# Patient Record
Sex: MALE | Race: WHITE | NOT HISPANIC OR LATINO | Employment: FULL TIME | URBAN - METROPOLITAN AREA
[De-identification: names, ages, dates, MRNs, and addresses within clinical notes are randomized per-mention and may not be internally consistent; named-entity substitution may affect disease eponyms.]

---

## 2017-08-21 ENCOUNTER — OFFICE VISIT (OUTPATIENT)
Dept: URGENT CARE | Age: 22
End: 2017-08-21
Payer: COMMERCIAL

## 2020-12-31 ENCOUNTER — IMMUNIZATIONS (OUTPATIENT)
Dept: FAMILY MEDICINE CLINIC | Facility: HOSPITAL | Age: 25
End: 2020-12-31

## 2020-12-31 DIAGNOSIS — Z23 ENCOUNTER FOR IMMUNIZATION: ICD-10-CM

## 2020-12-31 PROCEDURE — 91301 SARS-COV-2 / COVID-19 MRNA VACCINE (MODERNA) 100 MCG: CPT

## 2020-12-31 PROCEDURE — 0011A SARS-COV-2 / COVID-19 MRNA VACCINE (MODERNA) 100 MCG: CPT

## 2021-01-19 ENCOUNTER — OFFICE VISIT (OUTPATIENT)
Dept: FAMILY MEDICINE CLINIC | Facility: CLINIC | Age: 26
End: 2021-01-19

## 2021-01-19 VITALS
RESPIRATION RATE: 18 BRPM | DIASTOLIC BLOOD PRESSURE: 90 MMHG | HEART RATE: 98 BPM | BODY MASS INDEX: 35.16 KG/M2 | TEMPERATURE: 97 F | SYSTOLIC BLOOD PRESSURE: 126 MMHG | HEIGHT: 72 IN | OXYGEN SATURATION: 98 % | WEIGHT: 259.6 LBS

## 2021-01-19 DIAGNOSIS — J45.20 MILD INTERMITTENT ASTHMA WITHOUT COMPLICATION: ICD-10-CM

## 2021-01-19 DIAGNOSIS — J30.89 ALLERGIC RHINITIS DUE TO OTHER ALLERGIC TRIGGER, UNSPECIFIED SEASONALITY: ICD-10-CM

## 2021-01-19 DIAGNOSIS — L20.9 ATOPIC DERMATITIS, UNSPECIFIED TYPE: Primary | ICD-10-CM

## 2021-01-19 PROBLEM — J30.9 ALLERGIC RHINITIS: Status: ACTIVE | Noted: 2021-01-19

## 2021-01-19 PROCEDURE — 3008F BODY MASS INDEX DOCD: CPT | Performed by: FAMILY MEDICINE

## 2021-01-19 PROCEDURE — 3725F SCREEN DEPRESSION PERFORMED: CPT | Performed by: FAMILY MEDICINE

## 2021-01-19 PROCEDURE — 99203 OFFICE O/P NEW LOW 30 MIN: CPT | Performed by: FAMILY MEDICINE

## 2021-01-19 RX ORDER — CETIRIZINE HYDROCHLORIDE 10 MG/1
10 TABLET ORAL DAILY
Qty: 90 TABLET | Refills: 3 | Status: SHIPPED | OUTPATIENT
Start: 2021-01-19 | End: 2021-03-08 | Stop reason: SDUPTHER

## 2021-01-19 RX ORDER — BUDESONIDE AND FORMOTEROL FUMARATE DIHYDRATE 80; 4.5 UG/1; UG/1
2 AEROSOL RESPIRATORY (INHALATION) AS NEEDED
Qty: 1 INHALER | Refills: 2 | Status: SHIPPED | OUTPATIENT
Start: 2021-01-19

## 2021-01-19 RX ORDER — TRIAMCINOLONE ACETONIDE 1 MG/G
CREAM TOPICAL
COMMUNITY
Start: 2020-11-25 | End: 2021-01-19 | Stop reason: ALTCHOICE

## 2021-01-19 NOTE — PROGRESS NOTES
Assessment/Plan:    Allergic rhinitis  Previous history of allergic rhinitis, patient has some intermittent symptoms  May be contributing to intermittent audible wheezing which may not be true wheezing but he does have a history of asthma as well  Trial daily antihistamine and will re-evaluate symptoms in 2-4 weeks at annual physical    Mild intermittent asthma without complication  History of mild exercise induced asthma  He notes some intermittent wheezing the last several months  Has  Advair which he uses infrequently  Will discontinue Advair and prescribe Symbicort for use as needed  Recommend use prior to moderate/intense exercise to prevent symptoms  Consider PFTs given no record of previous  Follow up at annual physical in 2-4 weeks      Atopic dermatitis  Papular/vesicular rash on back of neck - has been using Triamcinolone cream daily for a few months and compared to previous photo rash is not improved  Recommend discontinuing Triamcinolone and starting OTC emollient TID  Will follow up in 2-4 weeks at annual physical       Diagnoses and all orders for this visit:    Atopic dermatitis, unspecified type    Allergic rhinitis due to other allergic trigger, unspecified seasonality  -     cetirizine (ZyrTEC) 10 mg tablet; Take 1 tablet (10 mg total) by mouth daily    Mild intermittent asthma without complication  -     budesonide-formoterol (SYMBICORT) 80-4 5 MCG/ACT inhaler; Inhale 2 puffs as needed (wheezing, shortness of breath, chest tightness) Rinse mouth after use  Other orders  -     Discontinue: fluticasone-salmeterol (Advair Diskus) 250-50 mcg/dose inhaler; Inhale 1 puff 2 (two) times a day  -     Discontinue: triamcinolone (KENALOG) 0 1 % cream; APPLY TO AFFECTED AREA TWICE A DAY UNTIL DIRECTED TO STOP          Subjective:      Patient ID: Maria A Connell is a 31 y o  male who presents to [facility] care and for evaluation of rash on the back of his neck present for several months   He was previously prescribed Triamcinolone cream and has been using it at least daily for 2 months without improvement  He also reports a history of exercise induced asthma  He has an Advair inhaler that is several years old and , he uses it infrequently  He does not intermittent expiratory wheezing but has not had an asthma exacerbation that required medical evaluation  He works as a   The following portions of the patient's history were reviewed and updated as appropriate:   He  has no past medical history on file  Patient Active Problem List    Diagnosis Date Noted    Atopic dermatitis 2021    Allergic rhinitis 2021    Mild intermittent asthma without complication      He  has no past surgical history on file  His family history is not on file  He  reports that he has been smoking cigarettes  He does not have any smokeless tobacco history on file  He reports current alcohol use  He reports that he does not use drugs  Current Outpatient Medications   Medication Sig Dispense Refill    naproxen (NAPROSYN) 500 mg tablet Take 1 tablet (500 mg total) by mouth 2 (two) times a day with meals  30 tablet 0    budesonide-formoterol (SYMBICORT) 80-4 5 MCG/ACT inhaler Inhale 2 puffs as needed (wheezing, shortness of breath, chest tightness) Rinse mouth after use  1 Inhaler 2    cetirizine (ZyrTEC) 10 mg tablet Take 1 tablet (10 mg total) by mouth daily 90 tablet 3     No current facility-administered medications for this visit  He has No Known Allergies       Review of Systems   Constitutional: Negative for chills, fatigue and fever  HENT: Negative for congestion, sneezing and sore throat  Eyes: Negative for visual disturbance  Respiratory: Positive for chest tightness and wheezing  Negative for cough and shortness of breath  Cardiovascular: Negative for chest pain  Gastrointestinal: Negative for abdominal pain     Musculoskeletal: Negative for arthralgias and myalgias  Skin: Positive for rash  Neurological: Negative for headaches  Psychiatric/Behavioral: Negative for sleep disturbance  Objective:      /90   Pulse 98   Temp (!) 97 °F (36 1 °C)   Resp 18   Ht 6' (1 829 m)   Wt 118 kg (259 lb 9 6 oz)   SpO2 98%   BMI 35 21 kg/m²          Physical Exam  Vitals signs reviewed  Constitutional:       Appearance: Normal appearance  HENT:      Head: Normocephalic and atraumatic  Right Ear: External ear normal       Left Ear: External ear normal    Eyes:      Extraocular Movements: Extraocular movements intact  Conjunctiva/sclera: Conjunctivae normal    Neck:      Musculoskeletal: Normal range of motion  Cardiovascular:      Rate and Rhythm: Normal rate and regular rhythm  Pulses: Normal pulses  Heart sounds: Normal heart sounds  Pulmonary:      Effort: Pulmonary effort is normal       Breath sounds: Normal breath sounds  Abdominal:      General: Abdomen is flat  Musculoskeletal: Normal range of motion  Skin:     General: Skin is warm and dry  Capillary Refill: Capillary refill takes less than 2 seconds  Findings: Rash (back of neck, vesicular/papular and patchy erythema typical of atopic dermatitis) present  Neurological:      General: No focal deficit present  Mental Status: He is alert and oriented to person, place, and time     Psychiatric:         Mood and Affect: Mood normal          Behavior: Behavior normal

## 2021-01-19 NOTE — ASSESSMENT & PLAN NOTE
Papular/vesicular rash on back of neck - has been using Triamcinolone cream daily for a few months and compared to previous photo rash is not improved  Recommend discontinuing Triamcinolone and starting OTC emollient TID  Will follow up in 2-4 weeks at annual physical

## 2021-01-19 NOTE — ASSESSMENT & PLAN NOTE
Previous history of allergic rhinitis, patient has some intermittent symptoms  May be contributing to intermittent audible wheezing which may not be true wheezing but he does have a history of asthma as well  Trial daily antihistamine and will re-evaluate symptoms in 2-4 weeks at annual physical

## 2021-01-19 NOTE — ASSESSMENT & PLAN NOTE
History of mild exercise induced asthma  He notes some intermittent wheezing the last several months  Has  Advair which he uses infrequently  Will discontinue Advair and prescribe Symbicort for use as needed  Recommend use prior to moderate/intense exercise to prevent symptoms  Consider PFTs given no record of previous  Follow up at annual physical in 2-4 weeks

## 2021-01-27 ENCOUNTER — IMMUNIZATIONS (OUTPATIENT)
Dept: FAMILY MEDICINE CLINIC | Facility: HOSPITAL | Age: 26
End: 2021-01-27

## 2021-01-27 DIAGNOSIS — Z23 ENCOUNTER FOR IMMUNIZATION: Primary | ICD-10-CM

## 2021-01-27 PROCEDURE — 0012A SARS-COV-2 / COVID-19 MRNA VACCINE (MODERNA) 100 MCG: CPT

## 2021-01-27 PROCEDURE — 91301 SARS-COV-2 / COVID-19 MRNA VACCINE (MODERNA) 100 MCG: CPT

## 2021-02-22 ENCOUNTER — OFFICE VISIT (OUTPATIENT)
Dept: FAMILY MEDICINE CLINIC | Facility: CLINIC | Age: 26
End: 2021-02-22

## 2021-02-22 VITALS
OXYGEN SATURATION: 99 % | BODY MASS INDEX: 34.61 KG/M2 | SYSTOLIC BLOOD PRESSURE: 120 MMHG | DIASTOLIC BLOOD PRESSURE: 92 MMHG | WEIGHT: 255.2 LBS | TEMPERATURE: 98.6 F | RESPIRATION RATE: 16 BRPM | HEART RATE: 90 BPM

## 2021-02-22 DIAGNOSIS — L20.9 ATOPIC DERMATITIS, UNSPECIFIED TYPE: ICD-10-CM

## 2021-02-22 DIAGNOSIS — J30.89 ALLERGIC RHINITIS DUE TO OTHER ALLERGIC TRIGGER, UNSPECIFIED SEASONALITY: Primary | ICD-10-CM

## 2021-02-22 DIAGNOSIS — J45.20 MILD INTERMITTENT ASTHMA WITHOUT COMPLICATION: ICD-10-CM

## 2021-02-22 PROCEDURE — 99213 OFFICE O/P EST LOW 20 MIN: CPT | Performed by: FAMILY MEDICINE

## 2021-02-22 NOTE — PROGRESS NOTES
Assessment/Plan:    Allergic rhinitis  Significantly improved since starting Zyrtec - will continue  Follow up as needed  Mild intermittent asthma without complication  Wheezing resolved  Has not needed to use Symbicort  Atopic dermatitis  Not improved with emollients or triamcinolone cream  Now appears more like acne with closed comedones on erythematous base, easily expressed, non painful, non pruritic  Advised to keep clean and dry, exfoliate  Will trial benzoyl peroxide if not improved once discontinuing emollients  Diagnoses and all orders for this visit:    Allergic rhinitis due to other allergic trigger, unspecified seasonality    Mild intermittent asthma without complication    Atopic dermatitis, unspecified type          Subjective:      Patient ID: Sofi Locke is a 31 y o  male who presents for follow up of asthma, allergies, and atopic dermatitis  Allergies and asthma are significantly improved since starting Zyrtec  He has not used his inhaler at all  Rash on back of neck not bothersome, he feels this has improved  The following portions of the patient's history were reviewed and updated as appropriate: allergies, current medications, past family history, past medical history, past social history, past surgical history and problem list     Review of Systems   Constitutional: Negative for fatigue and fever  HENT: Negative for congestion, postnasal drip, rhinorrhea and sore throat  Respiratory: Negative for cough and shortness of breath  Gastrointestinal: Negative for abdominal pain  Musculoskeletal: Negative for arthralgias and myalgias  Skin: Positive for rash  Neurological: Negative for headaches  Psychiatric/Behavioral: Negative for sleep disturbance  The patient is not nervous/anxious            Objective:      /92 (BP Location: Left arm, Patient Position: Sitting, Cuff Size: Large)   Pulse 90   Temp 98 6 °F (37 °C) (Temporal)   Resp 16   Wt 116 kg (255 lb 3 2 oz)   SpO2 99%   BMI 34 61 kg/m²          Physical Exam  Vitals signs reviewed  Constitutional:       Appearance: Normal appearance  HENT:      Head: Normocephalic and atraumatic  Right Ear: External ear normal       Left Ear: External ear normal    Eyes:      Extraocular Movements: Extraocular movements intact  Conjunctiva/sclera: Conjunctivae normal    Neck:      Musculoskeletal: Normal range of motion  Comments: Posterior neck with multiple closed comedones on erythematous base - see photo below  Non painful, not bothersome  Cardiovascular:      Rate and Rhythm: Normal rate and regular rhythm  Pulmonary:      Effort: Pulmonary effort is normal    Abdominal:      General: Abdomen is flat  Neurological:      Mental Status: He is alert and oriented to person, place, and time     Psychiatric:         Mood and Affect: Mood normal          Behavior: Behavior normal

## 2021-02-23 NOTE — ASSESSMENT & PLAN NOTE
Not improved with emollients or triamcinolone cream  Now appears more like acne with closed comedones on erythematous base, easily expressed, non painful, non pruritic  Advised to keep clean and dry, exfoliate  Will trial benzoyl peroxide if not improved once discontinuing emollients

## 2021-02-26 ENCOUNTER — TELEPHONE (OUTPATIENT)
Dept: FAMILY MEDICINE CLINIC | Facility: CLINIC | Age: 26
End: 2021-02-26

## 2021-02-26 DIAGNOSIS — L70.0 ACNE VULGARIS: Primary | ICD-10-CM

## 2021-02-26 RX ORDER — CLINDAMYCIN AND BENZOYL PEROXIDE 10; 50 MG/G; MG/G
GEL TOPICAL 2 TIMES DAILY
Qty: 25 G | Refills: 0 | Status: SHIPPED | OUTPATIENT
Start: 2021-02-26 | End: 2022-03-10

## 2021-02-26 NOTE — TELEPHONE ENCOUNTER
Spoke to patient - states his rash is improved now that he has discontinued emollients  Sent Benzaclin to his pharmacy - patient notified and will  and use BID  Patient understands to notify provider if not improving or if worsening

## 2021-09-27 ENCOUNTER — OFFICE VISIT (OUTPATIENT)
Dept: URGENT CARE | Age: 26
End: 2021-09-27
Payer: COMMERCIAL

## 2021-09-27 VITALS
HEART RATE: 69 BPM | DIASTOLIC BLOOD PRESSURE: 95 MMHG | SYSTOLIC BLOOD PRESSURE: 143 MMHG | BODY MASS INDEX: 35 KG/M2 | HEIGHT: 71 IN | OXYGEN SATURATION: 98 % | TEMPERATURE: 98 F | WEIGHT: 250 LBS

## 2021-09-27 DIAGNOSIS — M62.838 MUSCLE SPASM: Primary | ICD-10-CM

## 2021-09-27 PROCEDURE — S9083 URGENT CARE CENTER GLOBAL: HCPCS | Performed by: PHYSICIAN ASSISTANT

## 2021-09-27 PROCEDURE — G0382 LEV 3 HOSP TYPE B ED VISIT: HCPCS | Performed by: PHYSICIAN ASSISTANT

## 2021-09-27 RX ORDER — CYCLOBENZAPRINE HCL 5 MG
5 TABLET ORAL 3 TIMES DAILY PRN
Qty: 21 TABLET | Refills: 0 | Status: SHIPPED | OUTPATIENT
Start: 2021-09-27 | End: 2022-03-10

## 2021-09-27 NOTE — PROGRESS NOTES
[facility]        NAME: Deb Schwartz is a 31 y o  male  : 1995    MRN: [id number]  DATE: 2021  TIME: 6:58 PM    Assessment and Plan   Muscle spasm [M62 838]  1  Muscle spasm  cyclobenzaprine (FLEXERIL) 5 mg tablet         Patient Instructions       Follow up with PCP in 3-5 days  Proceed to  ER if symptoms worsen  Chief Complaint     Chief Complaint   Patient presents with    Mass     Noticed lump on the left shoulder tender  Denies fevers or weight loss  History of Present Illness         Patient states on Saturday he noticed a lump on the back of his left shoulder by his shoulder blade with some pain  It is a dull achy pain  He has not progressed from there  Last week he was doing a lot heavy lifting at work  He denies any other symptoms  Review of Systems   Review of Systems   Constitutional: Negative  HENT: Negative  Respiratory: Negative  Cardiovascular: Negative  Gastrointestinal: Negative  Musculoskeletal:        Lump on left shoulder with pain   Neurological: Negative  Psychiatric/Behavioral: Negative            Current Medications       Current Outpatient Medications:     budesonide-formoterol (SYMBICORT) 80-4 5 MCG/ACT inhaler, Inhale 2 puffs as needed (wheezing, shortness of breath, chest tightness) Rinse mouth after use , Disp: 1 Inhaler, Rfl: 2    cetirizine (ZyrTEC) 10 mg tablet, Take 1 tablet (10 mg total) by mouth daily, Disp: 30 tablet, Rfl: 11    clindamycin-benzoyl peroxide (BENZACLIN) gel, Apply topically 2 (two) times a day, Disp: 25 g, Rfl: 0    fluticasone (FLONASE) 50 mcg/act nasal spray, 2 sprays into each nostril daily for 365 doses, Disp: 1 Bottle, Rfl: 12    cyclobenzaprine (FLEXERIL) 5 mg tablet, Take 1 tablet (5 mg total) by mouth 3 (three) times a day as needed for muscle spasms, Disp: 21 tablet, Rfl: 0    Current Allergies     Allergies as of 2021    (No Known Allergies)            The following portions of the patient's history were reviewed and updated as appropriate: allergies, current medications, past family history, past medical history, past social history, past surgical history and problem list      Past Medical History:   Diagnosis Date    Allergies        Past Surgical History:   Procedure Laterality Date    CIRCUMCISION      LASIK Bilateral     WISDOM TOOTH EXTRACTION         Family History   Problem Relation Age of Onset    No Known Problems Mother     No Known Problems Father     No Known Problems Brother          Medications have been verified  Objective   /95   Pulse 69   Temp 98 °F (36 7 °C)   Ht 5' 11" (1 803 m)   Wt 113 kg (250 lb)   SpO2 98%   BMI 34 87 kg/m²        Physical Exam     Physical Exam  Vitals reviewed  Constitutional:       General: He is not in acute distress  Appearance: Normal appearance  He is not ill-appearing, toxic-appearing or diaphoretic  HENT:      Head: Normocephalic and atraumatic  Cardiovascular:      Rate and Rhythm: Normal rate and regular rhythm  Pulses: Normal pulses  Heart sounds: Normal heart sounds  Pulmonary:      Effort: Pulmonary effort is normal       Breath sounds: Normal breath sounds  Skin:     General: Skin is warm and dry  Capillary Refill: Capillary refill takes less than 2 seconds  Findings: No rash  Comments:   Full shoulder range of motion without pain  No mass palpated  No erythema skin  Mild tenderness to palpation over left trapezius  Mild muscle spasm noted  Neurological:      General: No focal deficit present  Mental Status: He is alert and oriented to person, place, and time     Psychiatric:         Mood and Affect: Mood normal          Behavior: Behavior normal

## 2023-09-13 ENCOUNTER — APPOINTMENT (OUTPATIENT)
Dept: URGENT CARE | Age: 28
End: 2023-09-13
Payer: OTHER MISCELLANEOUS

## 2023-09-13 PROCEDURE — G0383 LEV 4 HOSP TYPE B ED VISIT: HCPCS

## 2023-09-13 PROCEDURE — 99284 EMERGENCY DEPT VISIT MOD MDM: CPT

## 2023-09-20 ENCOUNTER — OFFICE VISIT (OUTPATIENT)
Dept: FAMILY MEDICINE CLINIC | Facility: CLINIC | Age: 28
End: 2023-09-20
Payer: COMMERCIAL

## 2023-09-20 VITALS
BODY MASS INDEX: 34.58 KG/M2 | OXYGEN SATURATION: 99 % | SYSTOLIC BLOOD PRESSURE: 122 MMHG | HEIGHT: 71 IN | HEART RATE: 76 BPM | WEIGHT: 247 LBS | DIASTOLIC BLOOD PRESSURE: 82 MMHG

## 2023-09-20 DIAGNOSIS — Z13.220 SCREENING CHOLESTEROL LEVEL: ICD-10-CM

## 2023-09-20 DIAGNOSIS — Z13.0 SCREENING FOR DEFICIENCY ANEMIA: ICD-10-CM

## 2023-09-20 DIAGNOSIS — Z13.29 SCREENING FOR THYROID DISORDER: ICD-10-CM

## 2023-09-20 DIAGNOSIS — R03.0 ELEVATED BLOOD PRESSURE READING: ICD-10-CM

## 2023-09-20 DIAGNOSIS — Z13.1 SCREENING FOR DIABETES MELLITUS (DM): ICD-10-CM

## 2023-09-20 DIAGNOSIS — Z00.00 ENCOUNTER FOR PREVENTATIVE ADULT HEALTH CARE EXAMINATION: Primary | ICD-10-CM

## 2023-09-20 PROCEDURE — 99395 PREV VISIT EST AGE 18-39: CPT | Performed by: FAMILY MEDICINE
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
## 2023-09-20 NOTE — PATIENT INSTRUCTIONS
Please obtain the labs that I have ordered for you fasting. No food or drink except for water for 8 to 12 hours before. You can get the labs in about 2 months, it is scheduled for November 20 and after, a week before is okay. I will follow-up with you in about 2 weeks for your blood pressure. If your pressures on top are greater than 140 regularly for 3 or 4 days please call me so that we can discuss that earlier. If you have any other questions or concerns before follow-up do not hesitate to reach out to our office.

## 2023-10-05 ENCOUNTER — TELEPHONE (OUTPATIENT)
Dept: FAMILY MEDICINE CLINIC | Facility: CLINIC | Age: 28
End: 2023-10-05

## 2023-10-05 NOTE — TELEPHONE ENCOUNTER
Systolic ranging between 129-378 manually through his girlfriend. His auto cuff are rating his blood pressure higher in the 130-140. He has been monitoring and he is not having any symptoms associated.      Herberth Calvert DO  [facility]  10/5/2023 2:01 PM
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
## 2023-11-27 ENCOUNTER — RA CDI HCC (OUTPATIENT)
Dept: OTHER | Facility: HOSPITAL | Age: 28
End: 2023-11-27

## 2023-11-27 NOTE — PROGRESS NOTES
[address] [facility] coding opportunities       Chart reviewed, no opportunity found: CHART REVIEWED, NO OPPORTUNITY FOUND        Patients Insurance        Commercial Insurance: Tomas Ramos

## 2023-12-04 ENCOUNTER — OFFICE VISIT (OUTPATIENT)
Dept: FAMILY MEDICINE CLINIC | Facility: CLINIC | Age: 28
End: 2023-12-04
Payer: COMMERCIAL

## 2023-12-04 VITALS
SYSTOLIC BLOOD PRESSURE: 130 MMHG | HEIGHT: 71 IN | BODY MASS INDEX: 34.86 KG/M2 | HEART RATE: 67 BPM | WEIGHT: 249 LBS | DIASTOLIC BLOOD PRESSURE: 80 MMHG | OXYGEN SATURATION: 98 %

## 2023-12-04 DIAGNOSIS — R03.0 ELEVATED BLOOD PRESSURE READING: Primary | ICD-10-CM

## 2023-12-04 DIAGNOSIS — J30.89 ALLERGIC RHINITIS DUE TO OTHER ALLERGIC TRIGGER, UNSPECIFIED SEASONALITY: ICD-10-CM

## 2023-12-04 DIAGNOSIS — J45.20 MILD INTERMITTENT ASTHMA WITHOUT COMPLICATION: ICD-10-CM

## 2023-12-04 PROCEDURE — 99213 OFFICE O/P EST LOW 20 MIN: CPT | Performed by: FAMILY MEDICINE

## 2023-12-04 NOTE — PROGRESS NOTES
Outpatient Note- Follow up     HPI:     Romeo Churchill , 33 y.o. male  presents today for follow-up of blood pressure. 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 The patient had a significant elevation in his blood pressure, but since being at home and evaluating on a pretty regular basis, his value is remaining around the 339S-205N systolic. He denies any significant chest pain, shortness of breath, nausea, vomiting, lightheadedness, dizziness, blurry vision, headache. He does not have any symptoms of sleep apnea currently. Previously he did have excessive snoring, but after starting Flonase, Zyrtec, and controlled his allergies, he is no longer having snoring episodes. It does happen occasionally, but his girlfriend just raises the head of the bed and this discontinues. Past Medical History:   Diagnosis Date    Allergic Unknown    Allergies     Asthma Unknown        ROS:   Review of Systems   See HPI    OBJECTIVE  Vitals:    12/04/23 1337   BP: 130/80   Pulse: 67   SpO2: 98%        Physical Exam  Constitutional:       General: He is not in acute distress. Appearance: Normal appearance. He is obese. He is not ill-appearing, toxic-appearing or diaphoretic. HENT:      Head: Normocephalic and atraumatic. Right Ear: Tympanic membrane, ear canal and external ear normal. There is no impacted cerumen. Left Ear: Tympanic membrane, ear canal and external ear normal. There is no impacted cerumen. Nose: Nose normal. No congestion or rhinorrhea. Mouth/Throat:      Mouth: Mucous membranes are moist.      Pharynx: No oropharyngeal exudate or posterior oropharyngeal erythema. Eyes:      General:         Right eye: No discharge. Left eye: No discharge. Pupils: Pupils are equal, round, and reactive to light. Cardiovascular:      Rate and Rhythm: Normal rate and regular rhythm.       Heart sounds: Normal heart sounds. No murmur heard. No friction rub. No gallop. Pulmonary:      Effort: Pulmonary effort is normal. No respiratory distress. Breath sounds: Normal breath sounds. No stridor. No wheezing, rhonchi or rales. Abdominal:      General: Bowel sounds are normal. There is no distension. Palpations: Abdomen is soft. Tenderness: There is no abdominal tenderness. Musculoskeletal:      Cervical back: Neck supple. No tenderness. Lymphadenopathy:      Cervical: No cervical adenopathy. Skin:     General: Skin is warm. Capillary Refill: Capillary refill takes 2 to 3 seconds. Comments: Cold hands   Neurological:      Mental Status: He is alert. ASSESSMENT AND PLAN   Mayito Padilla was seen today for follow-up. Diagnoses and all orders for this visit:    Elevated blood pressure reading  Patient's blood pressures remained stable in the 120s-130s. He is to continue to monitor every few weeks to ensure no increase in BP is noted. We did discuss that this is slightly elevated compared to normal.  We prefer it to be in the 836 systolic. If he has any new symptoms of headache, lightheadedness, dizziness, blurry vision, eye pain, headaches he will come in sooner to be reevaluated. Allergic rhinitis due to other allergic trigger, unspecified seasonality  Mild intermittent asthma without complication  Well-controlled on current medications. His mild intermittent asthma only occurs with URI symptoms/colds. His allergies have been well-controlled on Flonase and Zyrtec.            Drake Washington DO  [facility]  12/4/2023 2:09 PM

## 2023-12-18 ENCOUNTER — TELEPHONE (OUTPATIENT)
Dept: FAMILY MEDICINE CLINIC | Facility: CLINIC | Age: 28
End: 2023-12-18

## 2023-12-18 NOTE — TELEPHONE ENCOUNTER
----- Message from Sebastián Calle DO sent at 12/17/2023  4:38 PM EST -----  Please call patient and inform him that overall his labs are normal.  Overall his glucose, electrolytes, liver function test, kidney function, blood counts, are within normal limits.  His  cholesterol panel demonstrated mildly elevated triglycerides, he should watch his diet and increase his exercise.  Will repeat labs in 1 year.  If he has any other questions or concerns he may call or MyChart.    Sincerely,    Dr. Calle DO

## 2024-07-02 ENCOUNTER — APPOINTMENT (OUTPATIENT)
Dept: RADIOLOGY | Facility: CLINIC | Age: 29
End: 2024-07-02
Payer: COMMERCIAL

## 2024-07-02 ENCOUNTER — OFFICE VISIT (OUTPATIENT)
Dept: URGENT CARE | Facility: CLINIC | Age: 29
End: 2024-07-02
Payer: COMMERCIAL

## 2024-07-02 VITALS
HEART RATE: 87 BPM | SYSTOLIC BLOOD PRESSURE: 158 MMHG | OXYGEN SATURATION: 98 % | DIASTOLIC BLOOD PRESSURE: 105 MMHG | TEMPERATURE: 98 F | RESPIRATION RATE: 16 BRPM

## 2024-07-02 DIAGNOSIS — S99.922A TOE INJURY, LEFT, INITIAL ENCOUNTER: ICD-10-CM

## 2024-07-02 DIAGNOSIS — S99.922A TOE INJURY, LEFT, INITIAL ENCOUNTER: Primary | ICD-10-CM

## 2024-07-02 PROCEDURE — 73660 X-RAY EXAM OF TOE(S): CPT

## 2024-07-02 PROCEDURE — S9083 URGENT CARE CENTER GLOBAL: HCPCS | Performed by: NURSE PRACTITIONER

## 2024-07-02 PROCEDURE — G0382 LEV 3 HOSP TYPE B ED VISIT: HCPCS | Performed by: NURSE PRACTITIONER

## 2024-07-02 NOTE — PATIENT INSTRUCTIONS
--Initial read of x-ray negative for fracture. Will contact you with results if changes need to be made to the care plan discussed with you at the visit.    --Rest, avoid excess weight bearing  --Ice  --Advil or Motrin as needed. Alternative is OTC Voltaren gel.    --Wear hard bottomed shoe  --Follow-up with podiatrist for ongoing pain over the next 1-2 weeks

## 2024-07-02 NOTE — PROGRESS NOTES
[facility]        NAME: Frankie Hirsch is a 29 y.o. male  : 1995    MRN: [id number]  DATE: 2024  TIME: 8:56 AM    Assessment and Plan   Toe injury, left, initial encounter [S99.922A]  1. Toe injury, left, initial encounter  XR toe left great min 2 views            Patient Instructions     Patient Instructions   --Initial read of x-ray negative for fracture. Will contact you with results if changes need to be made to the care plan discussed with you at the visit.    --Rest, avoid excess weight bearing  --Ice  --Advil or Motrin as needed. Alternative is OTC Voltaren gel.    --Wear hard bottomed shoe  --Follow-up with podiatrist for ongoing pain over the next 1-2 weeks       If tests have been performed at [facility], our office will contact you with results if changes need to be made to the care plan discussed with you at the visit.  You can review your full results on [facility] MyChart.    Chief Complaint     Chief Complaint   Patient presents with    Toe Injury     Pt presents with left great toe injury.          History of Present Illness       Here with complaints of left great toe injury.    Initially injured 6 weeks ago when he kicked rock. No initial pain, but started hurting a day or two later.   Got better over the next week.   Then 2 days ago, he injured again.  Accidentally kicked great toe into [place].  No initial pain, but started hurting a little bit yesterday, then more so today.    Pain is primarily MTP joint.  Looks swollen.  No discoloration noted.    Rates pain 1-2/10 at present seated.  7/10 with walking.    Took Tylenol, applied ice.    No N/T/W.   Denies past toe injuries (other than above).  Denies hx gout.          Review of Systems   Review of Systems   Musculoskeletal:         Per HPI   Neurological:  Negative for weakness and numbness.         Current Medications       Current Outpatient Medications:     azelastine (ASTELIN) 0.1 % nasal spray, 2 sprays into each  nostril daily Use in each nostril as directed, Disp: , Rfl:     cetirizine (ZyrTEC) 10 mg tablet, Take 1 tablet (10 mg total) by mouth daily, Disp: 30 tablet, Rfl: 11    fluticasone (FLONASE) 50 mcg/act nasal spray, 2 sprays into each nostril daily, Disp: 48 mL, Rfl: 4    Fluticasone-Salmeterol,sensor, (AirDuo Digihaler) 113-14 MCG/ACT AEPB, Inhale 1 puff 2 (two) times a day Rinse mouth after use., Disp: 1 each, Rfl: 11    Current Allergies     Allergies as of 07/02/2024    (No Known Allergies)            The following portions of the patient's history were reviewed and updated as appropriate: allergies, current medications, past family history, past medical history, past social history, past surgical history and problem list.     Past Medical History:   Diagnosis Date    Allergic Unknown    Allergies     Asthma Unknown       Past Surgical History:   Procedure Laterality Date    CIRCUMCISION      EYE SURGERY  12/11/2018    Lasik    WISDOM TOOTH EXTRACTION         Family History   Problem Relation Age of Onset    Breast cancer Mother     Hypertension Father     No Known Problems Brother     Testicular cancer Neg Hx     Colon cancer Neg Hx     Prostate cancer Neg Hx          Medications have been verified.        Objective   BP (!) 158/105   Pulse 87   Temp 98 °F (36.7 °C)   Resp 16   SpO2 98%   No LMP for male patient.       Physical Exam     Physical Exam  Pulmonary:      Effort: Pulmonary effort is normal.   Musculoskeletal:         General: Swelling, tenderness and signs of injury present. No deformity. Normal range of motion.      Comments: Left foot TTP overlying first MTP joint with associated mild swelling.  No bruising, erythema.  Tenderness extends 2 cm to neck of first metatarsal.  Remainder of great toe nontender with mild swelling, proximally.    Remainder of left foot nontender with normal appearance.    Toe ROM normal with pain at limits.   Some increased pain with axial loading.    No increased  laxity.   Distal toe with normal temp, color, sensation, cap refill.   Normal gait.    Skin:     Findings: No bruising or erythema.   Neurological:      General: No focal deficit present.      Mental Status: He is alert.   Psychiatric:         Mood and Affect: Mood normal.       Orthopedic injury treatment    Date/Time: 7/2/2024 9:00 AM    Performed by: MARIE Moncada  Authorized by: MARIE Moncada    Patient Location:  [facility] Protocol:  Consent: Verbal consent not obtained.  Risks and benefits: risks, benefits and alternatives were discussed  Consent given by: patient  Patient understanding: patient states understanding of the procedure being performed  Patient consent: the patient's understanding of the procedure matches consent given  Procedure consent: procedure consent matches procedure scheduled  Required items: required blood products, implants, devices, and special equipment available  Patient identity confirmed: verbally with patient    Injury location:  Toe  Location details:  Left great toe  Injury type:  Soft tissue  Distal perfusion: normal    Neurological function: normal    Range of motion: normal    Local anesthesia used?: No    Immobilization: Ortho shoe.  Neurovascular status: Neurovascularly intact    Distal perfusion: normal    Neurological function: normal    Range of motion: normal    Patient tolerance:  Patient tolerated the procedure well with no immediate complications

## 2024-07-02 NOTE — LETTER
July 2, 2024     Patient: Frankie Hirsch   YOB: 1995   Date of Visit: 7/2/2024       To Whom it May Concern:    Frankie Hirsch was seen in my clinic on 7/2/2024. Please excuse from work 7/4/24 due to medical condition.      If you have any questions or concerns, please don't hesitate to call.         Sincerely,          MARIE Moncada        CC: No Recipients

## 2024-09-16 ENCOUNTER — RA CDI HCC (OUTPATIENT)
Dept: OTHER | Facility: HOSPITAL | Age: 29
End: 2024-09-16

## 2024-09-23 ENCOUNTER — OFFICE VISIT (OUTPATIENT)
Dept: FAMILY MEDICINE CLINIC | Facility: CLINIC | Age: 29
End: 2024-09-23
Payer: COMMERCIAL

## 2024-09-23 VITALS
OXYGEN SATURATION: 99 % | SYSTOLIC BLOOD PRESSURE: 130 MMHG | BODY MASS INDEX: 34.86 KG/M2 | HEIGHT: 71 IN | WEIGHT: 249 LBS | HEART RATE: 77 BPM | DIASTOLIC BLOOD PRESSURE: 82 MMHG

## 2024-09-23 DIAGNOSIS — Z00.00 ENCOUNTER FOR SCREENING AND PREVENTATIVE CARE: Primary | ICD-10-CM

## 2024-09-23 DIAGNOSIS — J45.20 MILD INTERMITTENT ASTHMA WITHOUT COMPLICATION: ICD-10-CM

## 2024-09-23 DIAGNOSIS — R94.31 ABNORMAL EKG: ICD-10-CM

## 2024-09-23 DIAGNOSIS — Z00.00 ANNUAL PHYSICAL EXAM: ICD-10-CM

## 2024-09-23 DIAGNOSIS — R79.89 ELEVATED LIVER FUNCTION TESTS: ICD-10-CM

## 2024-09-23 DIAGNOSIS — R74.01 ELEVATED ALT MEASUREMENT: ICD-10-CM

## 2024-09-23 DIAGNOSIS — E78.1 HYPERTRIGLYCERIDEMIA: ICD-10-CM

## 2024-09-23 DIAGNOSIS — R74.01 ELEVATED AST (SGOT): ICD-10-CM

## 2024-09-23 PROCEDURE — 99395 PREV VISIT EST AGE 18-39: CPT | Performed by: FAMILY MEDICINE

## 2024-09-23 NOTE — ASSESSMENT & PLAN NOTE
Overall well-controlled.  Patient is using the Advair intermittently for chest tightness and shortness of breath.  He may be increasing the exercise that he is performing, will reach out for albuterol if needed.

## 2024-09-23 NOTE — PROGRESS NOTES
Adult Annual Physical  Name: Frankie Hirsch      : 1995      MRN: [id number]  Encounter Provider: Sebastián Calle DO  Encounter Date: 2024   Encounter department: [facility]    Assessment & Plan  Encounter for screening and preventative care  Reviewed patient's labs that were obtained through his job.  Therefore we reviewed the labs and there were some that were elevated, see below for further details.  The patient denies any significant symptoms of chest pain, nausea, vomiting, lightheadedness, dizziness.  He does have a history mild intermittent asthma which she is currently treating with intermittent Advair.       Annual physical exam  Patient presents for annual physical.  Overall physical exam within normal limits, patient is to improve his diet and exercise to decrease BMI       Hypertriglyceridemia  Elevated triglycerides to 217.  Recommended follow-up labs in 3 to 4 months to determine if we require any further intervention.  He will look into behavioral management with improve diet and exercise.  Orders:    Lipid panel; Future    Lipid panel    Elevated liver function tests  Likely due to fatty liver disease.  Patient has mildly elevated cholesterol.  Therefore we will recommend that the patient repeat hepatic function panel in 3 to 4 months after improving diet and exercise.  If they remain elevated or continue to increase, may need to refer to GI.  Patient has only 2 drinks weekly, this is unlikely the cause of elevated LFTs  Orders:    Hepatic function panel; Future    Hepatic function panel    Elevated ALT measurement  See above  Orders:    Hepatic function panel; Future    Hepatic function panel    Elevated AST (SGOT)  See above  Orders:    Hepatic function panel; Future    Hepatic function panel    Abnormal EKG  Difficulty to evaluate EKG that was brought to the office.  Will have him follow-up with EKG when he obtains labs.  No acute symptoms or chest pain, it  was just poor quality with the paperwork that he was sent home with.  Will rule out any further issues or concerns.  Orders:    ECG 12 lead; Future    Mild intermittent asthma without complication  Overall well-controlled.  Patient is using the Advair intermittently for chest tightness and shortness of breath.  He may be increasing the exercise that he is performing, will reach out for albuterol if needed.       Immunizations and preventive care screenings were discussed with patient today. Appropriate education was printed on patient's after visit summary.    Counseling:  Alcohol/drug use: discussed moderation in alcohol intake, the recommendations for healthy alcohol use, and avoidance of illicit drug use.  Dental Health: discussed importance of regular tooth brushing, flossing, and dental visits.  Sexual health: discussed sexually transmitted diseases, partner selection, use of condoms, avoidance of unintended pregnancy, and contraceptive alternatives.  Exercise: the importance of regular exercise/physical activity was discussed. Recommend exercise 3-5 times per week for at least 30 minutes.       Depression Screening and Follow-up Plan: Patient was screened for depression during today's encounter. They screened negative with a PHQ-2 score of 0.    Tobacco Cessation Counseling: Tobacco cessation counseling was provided. The patient is sincerely urged to quit consumption of tobacco. He is not ready to quit tobacco. Medication options discussed. Side effects of medication not discussed. Patient agreed to medication. Patient only using cigars intermittently          History of Present Illness     Adult Annual Physical:  Patient presents for annual physical. Patient presents today for annual physical.  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  He also works in the hazmat side of response as well.  He would like us to  reevaluate several things within his workup along with the labs that were done and EKG.  Overall he does feel that his blood pressure is labile, it can go up into the 140s over 80s especially when he is on the job and in a stressful situation.  He will be looking into different options for weight loss and diet control..     Diet and Physical Activity:  - Diet/Nutrition: well balanced diet and consuming 3-5 servings of fruits/vegetables daily. better with junk food then in the past.  - Exercise:. nothing formal    Depression Screening:  - PHQ-2 Score: 0    General Health:  - Sleep: sleeps well. on average, 6 hours  - Hearing: normal hearing bilateral ears.  - Vision: no vision problems.  - Dental: regular dental visits and brushes teeth twice daily.     Health:  - History of STDs: no.   - Urinary symptoms: none.     Review of Systems   Constitutional:  Negative for chills, fever and unexpected weight change.   HENT:  Negative for congestion, ear discharge, ear pain, hearing loss, rhinorrhea, sinus pressure, sinus pain and sore throat.    Eyes:  Negative for visual disturbance.   Respiratory:  Positive for cough (w/ asthma) and chest tightness (w/ asthma).    Cardiovascular:  Negative for chest pain and palpitations.   Gastrointestinal:  Negative for abdominal pain, blood in stool, constipation, diarrhea, nausea and vomiting.   Genitourinary:  Negative for dysuria and frequency.   Skin:  Negative for rash and wound.   Neurological:  Negative for dizziness, light-headedness and headaches.   Psychiatric/Behavioral:  Negative for self-injury and suicidal ideas.      Medical History Reviewed by provider this encounter:  Tobacco  Allergies  Meds  Problems  Med Hx  Surg Hx  Fam Hx  Soc   Hx      Current Outpatient Medications on File Prior to Visit   Medication Sig Dispense Refill    azelastine (ASTELIN) 0.1 % nasal spray 2 sprays into each nostril daily Use in each nostril as directed      cetirizine (ZyrTEC) 10  "mg tablet Take 1 tablet (10 mg total) by mouth daily 30 tablet 11    fluticasone (FLONASE) 50 mcg/act nasal spray 2 sprays into each nostril daily 48 mL 4    Fluticasone-Salmeterol,sensor, (AirDuo Digihaler) 113-14 MCG/ACT AEPB Inhale 1 puff 2 (two) times a day Rinse mouth after use. 1 each 11     No current facility-administered medications on file prior to visit.      Social History     Tobacco Use    Smoking status: Some Days     Types: Cigars    Smokeless tobacco: Never   Vaping Use    Vaping status: Never Used   Substance and Sexual Activity    Alcohol use: Yes     Alcohol/week: 2.0 standard drinks of alcohol     Types: 2 Cans of beer per week    Drug use: Never    Sexual activity: Yes     Partners: Female     Birth control/protection: Condom Male       Objective     /82   Pulse 77   Ht 5' 11\" (1.803 m)   Wt 113 kg (249 lb)   SpO2 99%   BMI 34.73 kg/m²     Physical Exam    "

## 2025-01-05 ENCOUNTER — RESULTS FOLLOW-UP (OUTPATIENT)
Dept: FAMILY MEDICINE CLINIC | Facility: CLINIC | Age: 30
End: 2025-01-05

## 2025-01-15 ENCOUNTER — RA CDI HCC (OUTPATIENT)
Dept: OTHER | Facility: HOSPITAL | Age: 30
End: 2025-01-15

## 2025-01-15 NOTE — PROGRESS NOTES
HCC coding opportunities       Chart reviewed, no opportunity found: CHART REVIEWED, NO OPPORTUNITY FOUND   This is a reminder to address (resolve/update/assess) ALL HCC (risk adjustment) codes as found on active problem list for 2025 as patient scores reset to zero DAVID.     Patients Insurance        Commercial Insurance: [employer]

## 2025-01-23 ENCOUNTER — OFFICE VISIT (OUTPATIENT)
Dept: LAB | Facility: CLINIC | Age: 30
End: 2025-01-23
Payer: COMMERCIAL

## 2025-01-23 DIAGNOSIS — R94.31 ABNORMAL EKG: ICD-10-CM

## 2025-01-23 LAB
ATRIAL RATE: 70 BPM
P AXIS: 53 DEGREES
PR INTERVAL: 142 MS
QRS AXIS: 23 DEGREES
QRSD INTERVAL: 92 MS
QT INTERVAL: 376 MS
QTC INTERVAL: 406 MS
T WAVE AXIS: 33 DEGREES
VENTRICULAR RATE: 70 BPM

## 2025-01-23 PROCEDURE — 93005 ELECTROCARDIOGRAM TRACING: CPT

## 2025-01-23 PROCEDURE — 93010 ELECTROCARDIOGRAM REPORT: CPT | Performed by: STUDENT IN AN ORGANIZED HEALTH CARE EDUCATION/TRAINING PROGRAM

## 2025-01-27 ENCOUNTER — OFFICE VISIT (OUTPATIENT)
Dept: FAMILY MEDICINE CLINIC | Facility: CLINIC | Age: 30
End: 2025-01-27
Payer: COMMERCIAL

## 2025-01-27 VITALS
BODY MASS INDEX: 35.28 KG/M2 | HEART RATE: 90 BPM | OXYGEN SATURATION: 98 % | HEIGHT: 71 IN | DIASTOLIC BLOOD PRESSURE: 80 MMHG | WEIGHT: 252 LBS | SYSTOLIC BLOOD PRESSURE: 120 MMHG

## 2025-01-27 DIAGNOSIS — M22.2X2 PATELLOFEMORAL PAIN SYNDROME OF LEFT KNEE: ICD-10-CM

## 2025-01-27 DIAGNOSIS — R79.89 ELEVATED LIVER FUNCTION TESTS: ICD-10-CM

## 2025-01-27 DIAGNOSIS — J30.2 SEASONAL ALLERGIES: ICD-10-CM

## 2025-01-27 DIAGNOSIS — J45.990 EXERCISE INDUCED BRONCHOSPASM: ICD-10-CM

## 2025-01-27 DIAGNOSIS — J45.20 MILD INTERMITTENT ASTHMA WITHOUT COMPLICATION: Primary | ICD-10-CM

## 2025-01-27 DIAGNOSIS — M23.201 OLD TEAR OF LATERAL MENISCUS OF LEFT KNEE, UNSPECIFIED TEAR TYPE: ICD-10-CM

## 2025-01-27 DIAGNOSIS — G89.29 CHRONIC PAIN OF LEFT KNEE: ICD-10-CM

## 2025-01-27 DIAGNOSIS — M25.562 CHRONIC PAIN OF LEFT KNEE: ICD-10-CM

## 2025-01-27 DIAGNOSIS — E78.1 HYPERTRIGLYCERIDEMIA: ICD-10-CM

## 2025-01-27 PROCEDURE — 99214 OFFICE O/P EST MOD 30 MIN: CPT | Performed by: FAMILY MEDICINE

## 2025-01-27 RX ORDER — ALBUTEROL SULFATE 90 UG/1
2 INHALANT RESPIRATORY (INHALATION) EVERY 4 HOURS PRN
Qty: 18 G | Refills: 0 | Status: SHIPPED | OUTPATIENT
Start: 2025-01-27

## 2025-01-27 NOTE — ASSESSMENT & PLAN NOTE
Patient has a history of mild intermittent asthma.  This is typically related to allergies or exercise causing bronchospasm.  Patient has albuterol as needed, requires refill.  He also has air duo which helps to maintain the patient's respiratory status, typically well-controlled with albuterol.  He is not using it more than 2 or 3 times per week, and has no wheezing on examination.  Orders:    albuterol (Proventil HFA) 90 mcg/act inhaler; Inhale 2 puffs every 4 (four) hours as needed for wheezing

## 2025-01-27 NOTE — PROGRESS NOTES
Name: Frankie Hirsch      : 1995      MRN: [id number]  Encounter Provider: Sebastián Calle DO  Encounter Date: 2025   Encounter department: [facility]  :  Assessment & Plan  Mild intermittent asthma without complication  Patient has a history of mild intermittent asthma.  This is typically related to allergies or exercise causing bronchospasm.  Patient has albuterol as needed, requires refill.  He also has air duo which helps to maintain the patient's respiratory status, typically well-controlled with albuterol.  He is not using it more than 2 or 3 times per week, and has no wheezing on examination.  Orders:    albuterol (Proventil HFA) 90 mcg/act inhaler; Inhale 2 puffs every 4 (four) hours as needed for wheezing    Exercise induced bronchospasm  See mild intermittent asthma  Orders:    albuterol (Proventil HFA) 90 mcg/act inhaler; Inhale 2 puffs every 4 (four) hours as needed for wheezing    Seasonal allergies  See mild intermittent asthma.  Patient to continue using Astelin, Zyrtec, Flonase, and air duo  Orders:    albuterol (Proventil HFA) 90 mcg/act inhaler; Inhale 2 puffs every 4 (four) hours as needed for wheezing    Chronic pain of left knee  Patient had a left knee injury in the past.  Mechanism of action hyperextension.  Likely stretching/irritation of the meniscus, ligaments, or sequela such as PFS caused by ligamentous laxity.  Will have patient obtain x-ray of the knee and start considering physical therapy.  Sports medicine order placed to help facilitate MRI if needed.  Patient has attempted over-the-counter medications before, therefore we will hold off on any oral medications until x-ray and physical therapy is attempted.  Orders:    XR knee 4+ vw left injury; Future    Ambulatory Referral to Physical Therapy; Future    Ambulatory Referral to Orthopedic Surgery; Future    Old tear of lateral meniscus of left knee, unspecified tear type  See chronic pain of the  left knee  Orders:    XR knee 4+ vw left injury; Future    Ambulatory Referral to Physical Therapy; Future    Ambulatory Referral to Orthopedic Surgery; Future    Patellofemoral pain syndrome of left knee  See chronic pain of the left knee  Orders:    XR knee 4+ vw left injury; Future    Ambulatory Referral to Physical Therapy; Future    Ambulatory Referral to Orthopedic Surgery; Future    Elevated liver function tests  History of elevated liver function test.  This has resolved, and may be associated with a reduction in his cholesterol.  Will continue to monitor to ensure stability.       Hypertriglyceridemia  Continued elevation in triglycerides.  Patient is to monitor diet, increase exercise, and avoid high calorie low nutritional foods.              History of Present Illness   Patient presents today for follow-up of chronic conditions, labs, and chronic left knee pain.  Overall the patient's labs demonstrated a mild elevation in triglycerides, otherwise cholesterol was normal as well as LFTs.  The patient also has a history of mild intermittent asthma/exercise-induced asthma.  He has significant symptoms typically with the change of season from winter to spring, he has multiple allergies outside.  He is currently on air duo for this as well as intermittent use of albuterol.  He is on other allergy medications of Astelin and Zyrtec.  Overall his symptoms are fairly well-controlled at this time.    Additionally the patient presents today to discuss chronic left knee pain.  He had an injury where he hyperextended his knee.  It was several years ago, therefore he does not know the exact mechanism of action or why it occurred.  Since then he has been having some chronic knee pain where he gets popping and cracking with walking but the symptoms are most noticeable when he is seated or laying down.  It feels more like an ache or pressure in the knee that he can crack.  He was seen by chiropractor previously who gave  "him exercises to help, these do help minimally since the discomfort comes back once his knee pops or cracks.    Knee Pain   The incident occurred more than 1 week ago. Injury mechanism: hyperextension injury. The pain is present in the left knee. The quality of the pain is described as aching. The pain is at a severity of 3/10. The pain is mild. The pain has been Fluctuating since onset. Pertinent negatives include no inability to bear weight, loss of motion, loss of sensation, muscle weakness, numbness or tingling. He reports no foreign bodies present. Exacerbated by: being seated or laying down. He has tried rest (chiropracter) for the symptoms. The treatment provided mild relief.     Review of Systems   Neurological:  Negative for tingling and numbness.       Objective   /80   Pulse 90   Ht 5' 11\" (1.803 m)   Wt 114 kg (252 lb)   SpO2 98%   BMI 35.15 kg/m²      Physical Exam  Constitutional:       General: He is not in acute distress.     Appearance: Normal appearance. He is obese. He is not ill-appearing, toxic-appearing or diaphoretic.   HENT:      Head: Normocephalic and atraumatic.      Nose: No congestion or rhinorrhea.   Eyes:      General:         Right eye: No discharge.         Left eye: No discharge.   Cardiovascular:      Rate and Rhythm: Normal rate and regular rhythm.      Heart sounds: Normal heart sounds. No murmur heard.     No friction rub. No gallop.   Pulmonary:      Effort: Pulmonary effort is normal. No respiratory distress.      Breath sounds: Normal breath sounds. No stridor. No wheezing, rhonchi or rales.   Musculoskeletal:      Comments: No significant tenderness to palpation.  No swelling or pain along joint line.  Popliteal fossa normal.    Neurological:      Mental Status: He is alert.         "

## 2025-01-28 ENCOUNTER — APPOINTMENT (OUTPATIENT)
Dept: RADIOLOGY | Facility: CLINIC | Age: 30
End: 2025-01-28
Payer: COMMERCIAL

## 2025-01-28 DIAGNOSIS — G89.29 CHRONIC PAIN OF LEFT KNEE: ICD-10-CM

## 2025-01-28 DIAGNOSIS — M25.562 CHRONIC PAIN OF LEFT KNEE: ICD-10-CM

## 2025-01-28 DIAGNOSIS — M23.201 OLD TEAR OF LATERAL MENISCUS OF LEFT KNEE, UNSPECIFIED TEAR TYPE: ICD-10-CM

## 2025-01-28 DIAGNOSIS — M22.2X2 PATELLOFEMORAL PAIN SYNDROME OF LEFT KNEE: ICD-10-CM

## 2025-01-28 PROCEDURE — 73564 X-RAY EXAM KNEE 4 OR MORE: CPT

## 2025-01-30 ENCOUNTER — RESULTS FOLLOW-UP (OUTPATIENT)
Dept: FAMILY MEDICINE CLINIC | Facility: CLINIC | Age: 30
End: 2025-01-30

## 2025-02-05 ENCOUNTER — EVALUATION (OUTPATIENT)
Dept: PHYSICAL THERAPY | Facility: CLINIC | Age: 30
End: 2025-02-05
Payer: COMMERCIAL

## 2025-02-05 DIAGNOSIS — M76.52 PATELLAR TENDONITIS OF LEFT KNEE: Primary | ICD-10-CM

## 2025-02-05 DIAGNOSIS — M25.562 CHRONIC PAIN OF LEFT KNEE: ICD-10-CM

## 2025-02-05 DIAGNOSIS — M22.2X2 PATELLOFEMORAL PAIN SYNDROME OF LEFT KNEE: ICD-10-CM

## 2025-02-05 DIAGNOSIS — G89.29 CHRONIC PAIN OF LEFT KNEE: ICD-10-CM

## 2025-02-05 DIAGNOSIS — M23.201 OLD TEAR OF LATERAL MENISCUS OF LEFT KNEE, UNSPECIFIED TEAR TYPE: ICD-10-CM

## 2025-02-05 PROCEDURE — 97162 PT EVAL MOD COMPLEX 30 MIN: CPT | Performed by: PHYSICAL THERAPIST

## 2025-02-05 PROCEDURE — 97110 THERAPEUTIC EXERCISES: CPT | Performed by: PHYSICAL THERAPIST

## 2025-02-05 NOTE — PROGRESS NOTES
PT Evaluation     Today's date: 2025  Patient name: Frankie Hirsch  : 1995  MRN: [id number]  Referring provider: Sebastián Calle, *  Dx:   Encounter Diagnosis     ICD-10-CM    1. Patellar tendonitis of left knee  M76.52       2. Chronic pain of left knee  M25.562 Ambulatory Referral to Physical Therapy    G89.29       3. Old tear of lateral meniscus of left knee, unspecified tear type  M23.201 Ambulatory Referral to Physical Therapy      4. Patellofemoral pain syndrome of left knee  M22.2X2 Ambulatory Referral to Physical Therapy                     Assessment  Impairments: abnormal or restricted ROM, activity intolerance, impaired physical strength and pain with function    Assessment details: Signs and symptoms are consistent with left patellar tendonitis due to excessive knee extension ROM, impaired LLE strength and poor form during functional testing.  The patient has difficulty with sitting and lying down and this is limiting his ability to complete ADLs.  The patient has excessive knee extension on the L compared to the R.  The best way to work on this is to improve his muscle strength and several of his muscles are weaker on the LLE.  I also discussed with the patient about his bilateral collapsed arches but I do not believe this is directly affecting his current condition.  The patient would benefit from PT to help restore normal ROM, strength and improve level of function.    Understanding of Dx/Px/POC: excellent     Prognosis: excellent    Goals  STG : (2 weeks) - Patient demonstrates independence with HEP to be able to transition to HEP in the long term.  (2 weeks) - Decrease patient's pain by 50%.      LTG: (4 weeks) - Knee extension ROM will be equal bilaterally.  (6 weeks) - Improve patient's LLE strength to at least 4/5 to allow the patient to sit and lie down for extended periods of time.       Plan  Patient would benefit from: skilled physical therapy  Planned modality interventions:  cryotherapy, TENS and thermotherapy: hydrocollator packs    Planned therapy interventions: manual therapy, therapeutic exercise, therapeutic activities, neuromuscular re-education and gait training    Frequency: 1x week  Duration in weeks: 6        Subjective Evaluation    History of Present Illness  Date of onset: 2024  Mechanism of injury: The patient reports that about 5 years ago he hyper extended his L knee and it was sore for a while but it went away.  He always had a little bit of discomfort in the knee but over the past 3-4 months his knee has been getting progressively worse.  The patient notices it most when he is sitting or laying down for too long.  He has the most issues when he is a recliner and his knee is stretched out straight.  He also notices that when he is laying down that when his knee is straight is when it bothers him.  The patient denies any numbness or tingling.  The patient is not using medication, or using heat or ice.  He is doing a seated figure 4 stretch with lumbar flexion and this gives him some relief.  He also will straighten his knee or do a squat to crack his knee and this makes it feel better.  The patient doesn't have much issues with walking or stairs unless it is long periods of time.    Patient Goals  Patient goal: The patient wants see if there are stretches or exercises that will help his knee.  Pain  Current pain ratin  At best pain rating: 3  Location: L knee  Quality: tight and pressure  Aggravating factors: sitting          Objective     Active Range of Motion   Left Knee   Flexion: 139 degrees   Extension: 5 degrees     Right Knee   Flexion: 139 degrees   Extension: 2 degrees     Mobility   Patellar Mobility:   Left Knee   WFL: medial, lateral, superior and inferior.     Additional Mobility Details  Slight pain with superior and inferior glides    Strength/Myotome Testing     Left Hip   Planes of Motion   Flexion: 4-  Extension: 4-  Abduction: 4  External  "rotation: 4-    Right Hip   Planes of Motion   Flexion: 4  Extension: 4-  Abduction: 4  External rotation: 4+    Left Knee   Flexion: 4+  Extension: 4    Right Knee   Flexion: 4+  Extension: 4    Additional Strength Details  Slight pain on the patellar tendon with resisted knee extension    Ambulation     Ambulation: Level Surfaces     Additional Level Surfaces Ambulation Details  Over pronation bilaterally.    Functional Assessment      Squat    Left tibial anterior translation beyond toes and right tibial anterior translation beyond toes.     Forward Step Up 8\"   Left Leg  Within functional limits.     Forward Step Down 8\"   Left Leg  Varus.              Precautions:     POC Expires Auth Status Start Date Expiration Date PT Visit Limit           Date 2/5       Used 1       Remaining           Diagnosis:    Precautions:    Manuals 2/5                                               There Ex        SLR  Pink x20       Prone hip ext Green x20       Prone HS curl Blue x20                                                       Neuro Re-Ed                                                                                                                 Ther Act                                         Modalities                                                            "

## 2025-02-11 ENCOUNTER — OFFICE VISIT (OUTPATIENT)
Dept: OBGYN CLINIC | Facility: CLINIC | Age: 30
End: 2025-02-11
Payer: COMMERCIAL

## 2025-02-11 VITALS — BODY MASS INDEX: 36.12 KG/M2 | WEIGHT: 258 LBS | HEIGHT: 71 IN

## 2025-02-11 DIAGNOSIS — G89.29 CHRONIC PAIN OF LEFT KNEE: ICD-10-CM

## 2025-02-11 DIAGNOSIS — M22.2X2 PATELLOFEMORAL PAIN SYNDROME OF LEFT KNEE: Primary | ICD-10-CM

## 2025-02-11 DIAGNOSIS — M25.562 CHRONIC PAIN OF LEFT KNEE: ICD-10-CM

## 2025-02-11 DIAGNOSIS — M23.201 OLD TEAR OF LATERAL MENISCUS OF LEFT KNEE, UNSPECIFIED TEAR TYPE: ICD-10-CM

## 2025-02-11 PROCEDURE — 99203 OFFICE O/P NEW LOW 30 MIN: CPT | Performed by: STUDENT IN AN ORGANIZED HEALTH CARE EDUCATION/TRAINING PROGRAM

## 2025-02-11 RX ORDER — MELOXICAM 15 MG/1
15 TABLET ORAL DAILY
Qty: 30 TABLET | Refills: 2 | Status: SHIPPED | OUTPATIENT
Start: 2025-02-11 | End: 2025-05-12

## 2025-02-11 NOTE — PROGRESS NOTES
Initial office visit    Assessment:       Frankie Hirsch is a 29 y.o. male  with a history, physical examination and imaging consistent with left knee patellofemoral syndrome.    Plan:  Discussed x ray and physical exam findings of the left  knee at length with patient in the office today. Discussed physical exam findings consistent with patellofemoral syndrome of the left knee. Discussed conservative treatment with activity modification, RICE therapies, and oral anti-inflammatories as needed for persistent pain and inflammation. Also discussed physical therapy to work on strengthening with core and hip exercises to help support the knee. Patient expressed understanding. Referral placed to PT today. Also discussed Meloxicam for persistent pain and inflammation of the left knee. Discussed side effects and risks of medication. Patient agreeable. Prescription and instruction for oral use provided to the patient today. Patient will follow up in 6 weeks for reevaluation of current issue, if persistent pain and inflammation can consider MRI of the left knee. All of patients questions were answered in the office today. Patient encouraged to reach out via [employer] for further questions or concerns.       Assessment & Plan  Patellofemoral pain syndrome of left knee    Orders:    Ambulatory Referral to Orthopedic Surgery    meloxicam (Mobic) 15 mg tablet; Take 1 tablet (15 mg total) by mouth daily    Ambulatory Referral to Physical Therapy; Future    Chronic pain of left knee    Orders:    Ambulatory Referral to Orthopedic Surgery    Old tear of lateral meniscus of left knee, unspecified tear type    Orders:    Ambulatory Referral to Orthopedic Surgery           CC: Anterior left knee pain    History:       Frankie Hirsch is a 29 y.o. male  who presents to the office for evaluation of his  left knee. Patient notes several years ago he hyperextended his left knee and has experienced anterior left knee on and off since this time. He  notes a sensation of stiffness of the left knee in which he feels he needs to move the knee to crack it. He denies sensations of locking, clicking, or instability. He states he recently started physical therapy for one session with moderate relief of pain. He denies previous surgery, injury, or injections of the left knee. He denies numbness and tingling. Patient is an active .     Pain Assessment:  Character:  Aching   Location: Left knee  Associated Symptoms:  Knee swelling and mechanical grinding    PMHx:   Past Medical History:   Diagnosis Date    Allergic Unknown    Allergies     Asthma Unknown     PSHx:   Past Surgical History:   Procedure Laterality Date    CIRCUMCISION      EYE SURGERY  12/11/2018    Lasik    WISDOM TOOTH EXTRACTION       Medications:   Current Outpatient Medications on File Prior to Visit   Medication Sig Dispense Refill    albuterol (Proventil HFA) 90 mcg/act inhaler Inhale 2 puffs every 4 (four) hours as needed for wheezing 18 g 0    azelastine (ASTELIN) 0.1 % nasal spray 2 sprays into each nostril daily Use in each nostril as directed      cetirizine (ZyrTEC) 10 mg tablet Take 1 tablet (10 mg total) by mouth daily 30 tablet 11    fluticasone (FLONASE) 50 mcg/act nasal spray 2 sprays into each nostril daily 48 mL 4    Fluticasone-Salmeterol,sensor, (AirDuo Digihaler) 113-14 MCG/ACT AEPB Inhale 1 puff 2 (two) times a day Rinse mouth after use. 1 each 11     No current facility-administered medications on file prior to visit.     Allergies: No Known Allergies   Social History: Patient is a .   Family History:   Family History   Problem Relation Age of Onset    Breast cancer Mother     Hypertension Father     No Known Problems Brother     Testicular cancer Neg Hx     Colon cancer Neg Hx     Prostate cancer Neg Hx       Review of systems: ROS is negative other than that noted in the HPI.  Constitutional: Negative for fatigue and fever.   HENT: Negative for sore throat.     Respiratory: Negative for shortness of breath.    Cardiovascular: Negative for chest pain.   Gastrointestinal: Negative for abdominal pain.   Endocrine: Negative for cold intolerance and heat intolerance.   Genitourinary: Negative for flank pain.   Musculoskeletal: Negative for back pain.   Skin: Negative for rash.   Allergic/Immunologic: Negative for immunocompromised state.   Neurological: Negative for dizziness.   Psychiatric/Behavioral: Negative for agitation.       Comprehensive Physical Examination:  There were no vitals filed for this visit.     General Appearance: The patient is a well developed, well nourished male  in no apparent distress.  Orientation:  The patient is alert and interactive.  Oriented to time, place and person.  Mood and Affect:  The patient has normal mood and affect.  Gait and Station:  he is noted to ambulate with a normal heel toe gait pattern.  Observed standing alignment demonstrates normal physiologic valgus present.      Body Areas:          Knee Exam (focused):                RIGHT LEFT   ROM:   0-130 0-130   Crepitus  Negative Negative   Palpation: Effusion negative negative     MJL tenderness Negative Negative     LJL tenderness Negative Negative   Meniscus: Osmel Negative Negative    Apley's Compression Negative Negative   Instability: Varus at 0 + 30 stable stable     Valgus at 0 + 30 stable stable   Special Tests: Lachman Negative Negative     Posterior drawer Negative Negative     Anterior drawer Negative Negative     Pivot shift not tested not tested     Dial not tested not tested   Patella: Grind test Negative Positive     Mobility 1/4 1/4     Apprehension Negative Negative   Other: Single leg 1/4 squat not tested not tested      LE NV Exam: +2 DP/PT pulses bilaterally  Sensation intact to light touch L2-S1 bilaterally     Bilateral hip ROM demonstrates no pain actively or passively    No calf tenderness to palpation bilaterally    Radiographic Imaging:       I have  personally reviewed and interpreted radiographs of the left knee obtained on 1/28/2025 which were reviewed in detail with the patient.  The joint spaces of the medial and lateral compartments show no degenerative changes.  On the sunrise view, the patellofemoral compartment shows no degenerative changes. No acute fracture or dislocation. No other bony pathology is present.       Scribe Attestation      I,:  Harmony Leyva PA-C am acting as a scribe while in the presence of the attending physician.:       I,:  Celso Loyd MD personally performed the services described in this documentation    as scribed in my presence.:

## 2025-02-13 ENCOUNTER — OFFICE VISIT (OUTPATIENT)
Dept: PHYSICAL THERAPY | Facility: CLINIC | Age: 30
End: 2025-02-13
Payer: COMMERCIAL

## 2025-02-13 DIAGNOSIS — M25.562 CHRONIC PAIN OF LEFT KNEE: Primary | ICD-10-CM

## 2025-02-13 DIAGNOSIS — M76.52 PATELLAR TENDONITIS OF LEFT KNEE: ICD-10-CM

## 2025-02-13 DIAGNOSIS — M22.2X2 PATELLOFEMORAL PAIN SYNDROME OF LEFT KNEE: ICD-10-CM

## 2025-02-13 DIAGNOSIS — M23.201 OLD TEAR OF LATERAL MENISCUS OF LEFT KNEE, UNSPECIFIED TEAR TYPE: ICD-10-CM

## 2025-02-13 DIAGNOSIS — G89.29 CHRONIC PAIN OF LEFT KNEE: Primary | ICD-10-CM

## 2025-02-13 PROCEDURE — 97530 THERAPEUTIC ACTIVITIES: CPT | Performed by: PHYSICAL THERAPIST

## 2025-02-13 PROCEDURE — 97110 THERAPEUTIC EXERCISES: CPT | Performed by: PHYSICAL THERAPIST

## 2025-02-13 NOTE — PROGRESS NOTES
"Daily Note     Today's date: 2025  Patient name: Frankie Hirsch  : 1995  MRN: [id number]  Referring provider: Sebastián Calle, *  Dx:   Encounter Diagnosis     ICD-10-CM    1. Chronic pain of left knee  M25.562     G89.29       2. Old tear of lateral meniscus of left knee, unspecified tear type  M23.201       3. Patellofemoral pain syndrome of left knee  M22.2X2       4. Patellar tendonitis of left knee  M76.52                      Subjective: The patient reports that he is already noticing differences in his knee.      Objective: See treatment diary below      Assessment: Tolerated treatment well. Patient demonstrated fatigue post treatment and would benefit from continued PT    Today I pushed the patient working mostly on his hip and hamstring strength.  I was very impressed with how the patient did on his first day.  We even completed a few higher level standing exercises and he tolerated this well.    Plan: Continue per plan of care.      Precautions:     POC Expires Auth Status Start Date Expiration Date PT Visit Limit           Date       Used 1 2      Remaining           Diagnosis:    Precautions:    Manuals       Patella mobs  AB                                      There Ex        SLR  Pink x20 Pink 2x10      Prone hip ext Green x20 Green x20      Prone HS curl Blue x20 Blue x20      Hamstring st  4x15\"      S/L clams  BTB x20                                      Neuro Re-Ed        Single leg bridge  2x10      Bridge and curl  2x10                                                                                               Ther Act             Fwd walkouts  35# x5 laps      Donkey kicks   15# 2x10      X walks  GTB 40' 2 laps                                  Modalities                                                            "

## 2025-02-17 ENCOUNTER — OFFICE VISIT (OUTPATIENT)
Dept: PHYSICAL THERAPY | Facility: CLINIC | Age: 30
End: 2025-02-17
Payer: COMMERCIAL

## 2025-02-17 DIAGNOSIS — G89.29 CHRONIC PAIN OF LEFT KNEE: Primary | ICD-10-CM

## 2025-02-17 DIAGNOSIS — M22.2X2 PATELLOFEMORAL PAIN SYNDROME OF LEFT KNEE: ICD-10-CM

## 2025-02-17 DIAGNOSIS — M25.562 CHRONIC PAIN OF LEFT KNEE: Primary | ICD-10-CM

## 2025-02-17 DIAGNOSIS — M23.201 OLD TEAR OF LATERAL MENISCUS OF LEFT KNEE, UNSPECIFIED TEAR TYPE: ICD-10-CM

## 2025-02-17 DIAGNOSIS — M76.52 PATELLAR TENDONITIS OF LEFT KNEE: ICD-10-CM

## 2025-02-17 PROCEDURE — 97530 THERAPEUTIC ACTIVITIES: CPT | Performed by: PHYSICAL THERAPIST

## 2025-02-17 PROCEDURE — 97110 THERAPEUTIC EXERCISES: CPT | Performed by: PHYSICAL THERAPIST

## 2025-02-17 NOTE — PROGRESS NOTES
"Daily Note     Today's date: 2025  Patient name: Frankie Hirsch  : 1995  MRN: [id number]  Referring provider: Sebastián Calle, *  Dx:   Encounter Diagnosis     ICD-10-CM    1. Chronic pain of left knee  M25.562     G89.29       2. Old tear of lateral meniscus of left knee, unspecified tear type  M23.201       3. Patellofemoral pain syndrome of left knee  M22.2X2       4. Patellar tendonitis of left knee  M76.52                      Subjective: The patient reports that he was not that sore after the first session.      Objective: See treatment diary below      Assessment: Tolerated treatment well. Patient demonstrated fatigue post treatment and would benefit from continued PT    The patient had another good session.  I can tell his strength is already improving so we progressed his SLR and forward walkouts today and he did very well with this.  Tap downs and the squat machine was also added in today.  The patient had no pain with these and he demonstrated good muscle activation.     Plan: Continue per plan of care.      Precautions:     POC Expires Auth Status Start Date Expiration Date PT Visit Limit           Date      Used 1 2 3     Remaining           Diagnosis:    Precautions:    Manuals      Patella mobs  AB AB                                     There Ex        SLR  Pink x20 Pink 2x10 Green 2x10     Prone hip ext Green x20 Green x20 Green 2x10     Prone HS curl Blue x20 Blue x20 Blue x20     Hamstring st  4x15\" 4x15\"     S/L clams  BTB x20 BTB x20ea                                     Neuro Re-Ed        Single leg bridge  2x10 2x10     Bridge and curl  2x10 2x10                                                                                              Ther Act             Fwd walkouts  35# x5 laps 45# x5 laps W/ belt     Donkey kicks   15# 2x10 15# 2x10     X walks  GTB 40' 2 laps GTB 40' 2 laps     Tap downs      6\" 2x10       SL Squat machine             Modalities  "

## 2025-02-24 ENCOUNTER — OFFICE VISIT (OUTPATIENT)
Dept: PHYSICAL THERAPY | Facility: CLINIC | Age: 30
End: 2025-02-24
Payer: COMMERCIAL

## 2025-02-24 DIAGNOSIS — M25.562 CHRONIC PAIN OF LEFT KNEE: Primary | ICD-10-CM

## 2025-02-24 DIAGNOSIS — M22.2X2 PATELLOFEMORAL PAIN SYNDROME OF LEFT KNEE: ICD-10-CM

## 2025-02-24 DIAGNOSIS — M76.52 PATELLAR TENDONITIS OF LEFT KNEE: ICD-10-CM

## 2025-02-24 DIAGNOSIS — G89.29 CHRONIC PAIN OF LEFT KNEE: Primary | ICD-10-CM

## 2025-02-24 DIAGNOSIS — M23.201 OLD TEAR OF LATERAL MENISCUS OF LEFT KNEE, UNSPECIFIED TEAR TYPE: ICD-10-CM

## 2025-02-24 PROCEDURE — 97110 THERAPEUTIC EXERCISES: CPT | Performed by: PHYSICAL THERAPIST

## 2025-02-24 PROCEDURE — 97530 THERAPEUTIC ACTIVITIES: CPT | Performed by: PHYSICAL THERAPIST

## 2025-02-24 NOTE — PROGRESS NOTES
"Daily Note     Today's date: 2025  Patient name: Frankie Hirsch  : 1995  MRN: [id number]  Referring provider: Sebastián Calle, *  Dx:   Encounter Diagnosis     ICD-10-CM    1. Chronic pain of left knee  M25.562     G89.29       2. Old tear of lateral meniscus of left knee, unspecified tear type  M23.201       3. Patellofemoral pain syndrome of left knee  M22.2X2       4. Patellar tendonitis of left knee  M76.52                      Subjective: The patient reports that his pain on the side of the knee is mostly gone.  He is still getting some pain on top of the knee cap and this mostly occurs when he is sitting or laying down.        Objective: See treatment diary below      Assessment: Tolerated treatment well. Patient demonstrated fatigue post treatment and would benefit from continued PT    The patient is demonstrating good form with his table exercises and is getting great muscle activation.  He continues to do better so we have been focusing more on his standing exercises.  Several new exercises were added in today including box jumps to work on his dynamic knee control.  This is where it was more evident that I could see his valgus collapse so we worked on this.    Plan: Continue per plan of care.      Precautions:     POC Expires Auth Status Start Date Expiration Date PT Visit Limit           Date     Used 1 2 3 4    Remaining           Diagnosis:    Precautions:    Manuals     Patella mobs  AB AB AB                                    There Ex        SLR  Pink x20 Pink 2x10 Green 2x10 Green 2x10    Prone hip ext Green x20 Green x20 Green 2x10 Green 2x10    Prone HS curl Blue x20 Blue x20 Blue x20 Blue x20    Hamstring st  4x15\" 4x15\"     S/L clams  BTB x20 BTB x20ea BTB x20ea                                    Neuro Re-Ed        Single leg bridge  2x10 2x10     Bridge and curl  2x10 2x10 2x10                                                                      " "                       Ther Act             Fwd walkouts  35# x5 laps 45# x5 laps W/ belt     Big stick walkouts    15# x5 laps ea    Donkey kicks   15# 2x10 15# 2x10 15# 2x10    X walks  GTB 40' 2 laps GTB 40' 2 laps GTB 40' 2 laps    Tap downs      6\" 2x10  8\" 2x10     Box jump    12\" ecc landing x20    TRX Rev Lunge    x10ea    SL Squat machine        80# 2x10     Modalities                                                                "

## 2025-03-05 ENCOUNTER — OFFICE VISIT (OUTPATIENT)
Dept: PHYSICAL THERAPY | Facility: CLINIC | Age: 30
End: 2025-03-05
Payer: COMMERCIAL

## 2025-03-05 DIAGNOSIS — M25.562 CHRONIC PAIN OF LEFT KNEE: Primary | ICD-10-CM

## 2025-03-05 DIAGNOSIS — M23.201 OLD TEAR OF LATERAL MENISCUS OF LEFT KNEE, UNSPECIFIED TEAR TYPE: ICD-10-CM

## 2025-03-05 DIAGNOSIS — G89.29 CHRONIC PAIN OF LEFT KNEE: Primary | ICD-10-CM

## 2025-03-05 DIAGNOSIS — M76.52 PATELLAR TENDONITIS OF LEFT KNEE: ICD-10-CM

## 2025-03-05 DIAGNOSIS — M22.2X2 PATELLOFEMORAL PAIN SYNDROME OF LEFT KNEE: ICD-10-CM

## 2025-03-05 PROCEDURE — 97140 MANUAL THERAPY 1/> REGIONS: CPT | Performed by: PHYSICAL THERAPIST

## 2025-03-05 PROCEDURE — 97110 THERAPEUTIC EXERCISES: CPT | Performed by: PHYSICAL THERAPIST

## 2025-03-05 PROCEDURE — 97530 THERAPEUTIC ACTIVITIES: CPT | Performed by: PHYSICAL THERAPIST

## 2025-03-05 NOTE — PROGRESS NOTES
PT Discharge    Today's date: 3/5/2025  Patient name: Frankie Hirsch  : 1995  MRN: [id number]  Referring provider: Sebastián Calle, *  Dx:   Encounter Diagnosis     ICD-10-CM    1. Chronic pain of left knee  M25.562     G89.29       2. Old tear of lateral meniscus of left knee, unspecified tear type  M23.201       3. Patellofemoral pain syndrome of left knee  M22.2X2       4. Patellar tendonitis of left knee  M76.52                      Assessment  Impairments: abnormal or restricted ROM, activity intolerance, impaired physical strength and pain with function    Assessment details: The patient has made good progress since the start of PT.  His knee ROM looks excellent and he has no pain with ROM testing.  His strength is also significantly better which is helping him with most of his normal activities.  He did have trouble with running the other day but I believe this is because he is still lacking a little hip ER strength and still getting some knee valgus with functional testing.  Overall the patient is doing much better so me and the patient both agree that he is ready to be discharged to an [facility].    Understanding of Dx/Px/POC: excellent     Prognosis: excellent    Goals  STG : (2 weeks) - Patient demonstrates independence with HEP to be able to transition to HEP in the long term.  - 100%  (2 weeks) - Decrease patient's pain by 50%.  - 50%    LTG: (4 weeks) - Knee extension ROM will be equal bilaterally.  - 50%  (6 weeks) - Improve patient's LLE strength to at least 4/5 to allow the patient to sit and lie down for extended periods of time.  - 100%      Plan  Patient would benefit from: skilled physical therapy  Planned modality interventions: cryotherapy, TENS and thermotherapy: hydrocollator packs    Planned therapy interventions: manual therapy, therapeutic exercise, therapeutic activities, neuromuscular re-education and gait training    Plan details: Discharge      Subjective Evaluation    History of  Present Illness  Date of onset: 2024  Mechanism of injury: The patient reports that about 5 years ago he hyper extended his L knee and it was sore for a while but it went away.  He always had a little bit of discomfort in the knee but over the past 3-4 months his knee has been getting progressively worse.  The patient notices it most when he is sitting or laying down for too long.  He has the most issues when he is a recliner and his knee is stretched out straight.  He also notices that when he is laying down that when his knee is straight is when it bothers him.  The patient denies any numbness or tingling.  The patient is not using medication, or using heat or ice.  He is doing a seated figure 4 stretch with lumbar flexion and this gives him some relief.  He also will straighten his knee or do a squat to crack his knee and this makes it feel better.  The patient doesn't have much issues with walking or stairs unless it is long periods of time    Re-evaluation - The patient reports that his knee is doing much better.  He had pain one day when he didn't do his exercises for two days.  He also got pain on Saturday following him trying to run with his dog.  The patient reports that he is not a runner but he just tried to run and it did not bother him during but later that night his lateral knee was sore.  The patient states that sitting and laying down no longer really bothers his knee.  Overall the patient reports that his knee feels 60% better since starting PT.    Patient Goals  Patient goal: The patient wants see if there are stretches or exercises that will help his knee.  Pain  Current pain ratin  At worst pain ratin  Location: L knee  Quality: tight and pressure  Aggravating factors: sitting        Objective     Active Range of Motion   Left Knee   Flexion: 145 degrees   Extension: 4 degrees     Right Knee   Flexion: 139 degrees   Extension: 2 degrees     Mobility   Patellar Mobility:   Left Knee  "  WFL: medial, lateral, superior and inferior.     Additional Mobility Details  Slight pain with superior and inferior glides    Strength/Myotome Testing     Left Hip   Planes of Motion   Flexion: 4+  Extension: 4  Abduction: 4+  External rotation: 4    Right Hip   Planes of Motion   Flexion: 4+  Extension: 4  Abduction: 4+  External rotation: 4+    Left Knee   Flexion: 4+  Extension: 4+    Right Knee   Flexion: 4+  Extension: 4+    Additional Strength Details  No pain with MMT    Ambulation     Ambulation: Level Surfaces     Additional Level Surfaces Ambulation Details  Over pronation bilaterally.    Functional Assessment      Squat    Left tibial anterior translation beyond toes and right tibial anterior translation beyond toes.     Single Leg Squat   Left Leg  Valgus.     Right Leg  Valgus.     Forward Step Up 8\"   Left Leg  Within functional limits.              Precautions:     POC Expires Auth Status Start Date Expiration Date PT Visit Limit           Date 2/5 2/13 2/17 2/24 3/5   Used 1 2 3 4    Remaining           Diagnosis:    Precautions:    Manuals 2/5 2/13 2/17 2/24 3/5   Patella mobs  AB AB AB AB                                   There Ex        SLR  Pink x20 Pink 2x10 Green 2x10 Green 2x10    Prone hip ext Green x20 Green x20 Green 2x10 Green 2x10    Prone HS curl Blue x20 Blue x20 Blue x20 Blue x20    Hamstring st  4x15\" 4x15\"     S/L clams  BTB x20 BTB x20ea BTB x20ea                                    Neuro Re-Ed        Single leg bridge  2x10 2x10     Bridge and curl  2x10 2x10 2x10                                                                                             Ther Act             Fwd walkouts  35# x5 laps 45# x5 laps W/ belt     Big stick walkouts    15# x5 laps ea    Donkey kicks   15# 2x10 15# 2x10 15# 2x10    X walks  GTB 40' 2 laps GTB 40' 2 laps GTB 40' 2 laps    Tap downs      6\" 2x10  8\" 2x10     Box jump    12\" ecc landing x20    TRX Rev Lunge    x10ea    SL Squat machine      "   80# 2x10     Modalities

## 2025-03-27 VITALS — BODY MASS INDEX: 34.86 KG/M2 | HEIGHT: 71 IN | WEIGHT: 249 LBS

## 2025-03-27 DIAGNOSIS — M22.2X2 PATELLOFEMORAL PAIN SYNDROME OF LEFT KNEE: Primary | ICD-10-CM

## 2025-03-27 PROCEDURE — 99213 OFFICE O/P EST LOW 20 MIN: CPT | Performed by: STUDENT IN AN ORGANIZED HEALTH CARE EDUCATION/TRAINING PROGRAM

## 2025-03-27 NOTE — PROGRESS NOTES
Ortho Sports Medicine Knee Follow Up Visit     Assesment:     29 y.o. male with a history, physical examination and imaging consistent with left knee patellofemoral syndrome.      Assessment & Plan  Patellofemoral pain syndrome of left knee  Plan:    We reviewed their current history, physical exam, and imaging in detail today with the patient.  Reviewed continuing with home exercise program and strength training at this time. We discussed that he will continue to get improvements for several months as he strength trains. He can continue to take meloxicam as needed. Reviewed if he does not continue to progress we will consider and MRI in the future. All of their questions were answered in detail today.  The patient is agreeable to this plan.  They will follow-up in clinic as needed.                Conservative treatment:    Ice to knee for 20 minutes at least 1-2 times daily.  PT for ROM/strengthening to knee, hip and core.  Prescription NSAIDS for pain (Meloxicam).    Imaging:    All imaging from today was reviewed by myself and explained to the patient.       Injection:    No Injection planned at this time.      Surgery:     No surgery is recommended at this point, continue with conservative treatment plan as noted.    Follow up:    Return if symptoms worsen or fail to improve.        Chief Complaint   Patient presents with    Left Knee - Follow-up       History of Present Illness:    The patient is returns for follow up of left knee patellofemoral syndrome.  Since the prior visit, He reports significant improvement. He reports that if he continues to do his home exercise program he is 80% better. He does report that he will have pain if he does not do his home exercise program for a few days. He did attend formal physical therapy, and has been discharged with a HEP. He is taking meloxicam when needed. When he does get pain it is in his anterior and anterior lateral knee. He has returned to all activity as  tolerated at this time.        Knee Surgical History:  None    Past Medical, Social and Family History:  Past Medical History:   Diagnosis Date    Allergic Unknown    Allergies     Asthma Unknown     Past Surgical History:   Procedure Laterality Date    CIRCUMCISION      EYE SURGERY  12/11/2018    Lasik    WISDOM TOOTH EXTRACTION       No Known Allergies  Current Outpatient Medications on File Prior to Visit   Medication Sig Dispense Refill    albuterol (Proventil HFA) 90 mcg/act inhaler Inhale 2 puffs every 4 (four) hours as needed for wheezing 18 g 3    Albuterol-Budesonide (Airsupra) 90-80 MCG/ACT AERO Inhale 2 puffs every 6 (six) hours as needed (Asthma, wheezing, coughing) RINSE MOUTH 10.7 g 3    azelastine (ASTELIN) 0.1 % nasal spray 2 sprays into each nostril daily Use in each nostril as directed      cetirizine (ZyrTEC) 10 mg tablet Take 1 tablet (10 mg total) by mouth daily 30 tablet 11    fluticasone (FLONASE) 50 mcg/act nasal spray 2 sprays into each nostril daily 48 mL 4    Fluticasone-Salmeterol,sensor, (AirDuo Digihaler) 113-14 MCG/ACT AEPB Inhale 1 puff 2 (two) times a day Rinse mouth after use. 1 each 11    meloxicam (Mobic) 15 mg tablet Take 1 tablet (15 mg total) by mouth daily 30 tablet 2     No current facility-administered medications on file prior to visit.     Social History     Socioeconomic History    Marital status: Single     Spouse name: Not on file    Number of children: 0    Years of education: Not on file    Highest education level: Not on file   Occupational History    Not on file   Tobacco Use    Smoking status: Some Days     Types: Cigars    Smokeless tobacco: Never   Vaping Use    Vaping status: Never Used   Substance and Sexual Activity    Alcohol use: Yes     Alcohol/week: 2.0 standard drinks of alcohol     Types: 2 Cans of beer per week    Drug use: Never    Sexual activity: Yes     Partners: Female     Birth control/protection: Condom Male   Other Topics Concern    Not on file  "  Social History Narrative    Who lives in your home: Girlfriend     What type of home do you live in: [facility]     Age of your home: 2008    How long have you been living there: 12/2023    Type of heat: Forced hot air     Type of fuel: Electric    What type of juli is in your bedroom: Carpet    Do you have the following in or near your home:    Air products: Central air/air /humidifier     Pests: None    Pets: dog     Are pets allowed in bedroom: yes     Open fields, wooded areas nearby: Open fields     Basement: unfinished/dry     Exposure to second hand smoke: No        Habits:    Caffeine: coffee 1-2 cups daily-ice tea 1-2 weekly-    Chocolate: 1-2 times a week     Other:     Social Drivers of Health     Financial Resource Strain: Not on file   Food Insecurity: Not on file   Transportation Needs: Not on file   Physical Activity: Insufficiently Active (3/8/2021)    Exercise Vital Sign     Days of Exercise per Week: 1 day     Minutes of Exercise per Session: 60 min   Stress: Not on file   Social Connections: Not on file   Intimate Partner Violence: Not on file   Housing Stability: Not on file         I have reviewed the past medical, surgical, social and family history, medications and allergies as documented in the EMR.    Review of systems: ROS is negative other than that noted in the HPI.  Constitutional: Negative for fatigue and fever.      Physical Exam:    Height 5' 11\" (1.803 m), weight 113 kg (249 lb).    General/Constitutional: NAD, well developed, well nourished  HENT: Normocephalic, atraumatic  CV: Intact distal pulses, regular rate  Resp: No respiratory distress or labored breathing  Lymphatic: No lymphadenopathy palpated  Neuro: Alert and Oriented x 3, no focal deficits  Psych: Normal mood, normal affect, normal judgement, normal behavior  Skin: Warm, dry, no rashes, no erythema      Knee Exam (focused):                     RIGHT LEFT   ROM:   0-130 0-130   Crepitus   Negative Negative "   Palpation: Effusion negative negative     MJL tenderness Negative Negative     LJL tenderness Negative Negative   Meniscus: Osmel Negative Negative     Apley's Compression Negative Negative   Instability: Varus at 0 + 30 stable stable     Valgus at 0 + 30 stable stable   Special Tests: Lachman Negative Negative     Posterior drawer Negative Negative     Anterior drawer Negative Negative     Pivot shift not tested not tested     Dial not tested not tested   Patella: Grind test Negative Negative     Mobility 1/4 1/4     Apprehension Negative Negative   Other: Single leg 1/4 squat not tested not tested      LE NV Exam: +2 DP/PT pulses bilaterally  Sensation intact to light touch L2-S1 bilaterally     Bilateral hip ROM demonstrates no pain actively or passively     No calf tenderness to palpation bilaterally      Knee Imaging    No imaging was performed today      Scribe Attestation      I,:  Sammi Calles am acting as a scribe while in the presence of the attending physician.:       I,:  Celso Loyd MD personally performed the services described in this documentation    as scribed in my presence.:

## 2025-05-13 ENCOUNTER — OFFICE VISIT (OUTPATIENT)
Dept: FAMILY MEDICINE CLINIC | Facility: CLINIC | Age: 30
End: 2025-05-13
Payer: COMMERCIAL

## 2025-05-13 VITALS
HEART RATE: 84 BPM | SYSTOLIC BLOOD PRESSURE: 122 MMHG | HEIGHT: 71 IN | DIASTOLIC BLOOD PRESSURE: 82 MMHG | BODY MASS INDEX: 35.14 KG/M2 | OXYGEN SATURATION: 98 % | WEIGHT: 251 LBS

## 2025-05-13 DIAGNOSIS — J30.2 SEASONAL ALLERGIES: ICD-10-CM

## 2025-05-13 DIAGNOSIS — J45.40 MODERATE PERSISTENT ASTHMA WITHOUT COMPLICATION: ICD-10-CM

## 2025-05-13 DIAGNOSIS — E78.1 HYPERTRIGLYCERIDEMIA: ICD-10-CM

## 2025-05-13 DIAGNOSIS — M22.2X2 PATELLOFEMORAL PAIN SYNDROME OF LEFT KNEE: ICD-10-CM

## 2025-05-13 DIAGNOSIS — Z00.00 ENCOUNTER FOR PREVENTATIVE ADULT HEALTH CARE EXAMINATION: Primary | ICD-10-CM

## 2025-05-13 DIAGNOSIS — R79.89 ELEVATED LIVER FUNCTION TESTS: ICD-10-CM

## 2025-05-13 PROCEDURE — 99395 PREV VISIT EST AGE 18-39: CPT | Performed by: FAMILY MEDICINE

## 2025-05-13 NOTE — ASSESSMENT & PLAN NOTE
Following with allergy specialist.  He is on multiple medications to help with his seasonal allergies as well as asthma.  He is on maintenance medication of air duo along with Zyrtec, Flonase, albuterol, and albuterol-budesonide.  He utilizes Astelin as needed for supplementing nasal congestion treatment.

## 2025-05-13 NOTE — PROGRESS NOTES
Adult Annual Physical  Name: Frankie Hirsch      : 1995      MRN: [id number]  Encounter Provider: Sebastián Calle DO  Encounter Date: 2025   Encounter department: [facility]    :  Assessment & Plan  Encounter for preventative adult health care examination  Patient presents today to follow-up for preventative health and annual physical.  Overall he feels that he is doing well and has been followed up by his work with labs.  I reviewed them today.  See below for further information.  Overall the patient feels that he is doing well and does not have any major concerns on presentation.       Moderate persistent asthma without complication  Following with allergy specialist.  He is on multiple medications to help with his seasonal allergies as well as asthma.  He is on maintenance medication of air duo along with Zyrtec, Flonase, albuterol, and albuterol-budesonide.  He utilizes Astelin as needed for supplementing nasal congestion treatment.       Seasonal allergies  See moderate persistent asthma       Patellofemoral pain syndrome of left knee  Resolved.  Follow-up with PT and Ortho has led to clearance with full return to activity.  Patient is continuing to perform exercises and prevent further exacerbation.       Elevated liver function tests  Patient has history of elevated liver function tests.  This may be secondary to fatty liver disease due to elevated triglycerides.  I informed the patient to focus on reducing cholesterol in his diet and increasing exercise.  Will have him continue to monitor labs on a yearly basis.  Offered in 6 months, patient declined at this time.       Hypertriglyceridemia  History of hypertriglyceridemia into the 200s.  I recommended diet changes and increasing exercise.  Patient will be monitoring and will inform me if he does want to follow-up labs in 6 months, declined today.           Preventive Screenings:    - Prostate cancer screening: screening  not indicated     Immunizations:  - Immunizations due: Prevnar 20         History of Present Illness     Adult Annual Physical:  Patient presents for annual physical. Patient presents today to follow-up for annual physical.  Overall his chronic medical conditions are fairly well-controlled.  He is following up with to specialist, allergist for his moderate persistent asthma and regular allergies.  Additionally the patient went through Ortho and PT, but was cleared to return without restriction.  He previously had patellofemoral syndrome that he is doing exercises at home for and monitoring.  He has Mobic as needed..     Diet and Physical Activity:  - Diet/Nutrition: well balanced diet. trying to watch carbs and eating as healthy as he can  - Exercise: 1-2 times a week on average. Job physical,also lifting and attempting to be physically active.  Walking dog    Depression Screening:  - PHQ-2 Score: 0    General Health:  - Sleep: sleeps well. 6-7  - Hearing: normal hearing bilateral ears.  - Vision: previous LASIK surgery. no issues after the prior eye surgery/ lasik  - Dental: regular dental visits and brushes teeth twice daily.     Health:  - History of STDs: no.   - Urinary symptoms: none.     Review of Systems   Constitutional:  Negative for chills, fever and unexpected weight change.   HENT:  Negative for congestion, ear discharge, ear pain, hearing loss, postnasal drip, rhinorrhea, sinus pressure, sinus pain and sore throat.    Eyes:  Negative for visual disturbance.   Respiratory:  Positive for wheezing (intermittent). Negative for cough, chest tightness and shortness of breath.    Cardiovascular:  Negative for chest pain and palpitations.   Gastrointestinal:  Negative for abdominal pain, blood in stool, constipation, diarrhea, nausea and vomiting.   Genitourinary:  Negative for dysuria, frequency and hematuria.   Skin:  Negative for rash and wound.   Neurological:  Negative for dizziness, light-headedness  "and headaches.   Psychiatric/Behavioral:  Negative for self-injury and suicidal ideas.        Medical History Reviewed by provider this encounter:  Tobacco  Allergies  Meds  Problems  Med Hx  Surg Hx  Fam Hx  Soc   Hx    .  Current Outpatient Medications on File Prior to Visit   Medication Sig Dispense Refill    albuterol (Proventil HFA) 90 mcg/act inhaler Inhale 2 puffs every 4 (four) hours as needed for wheezing 18 g 3    Albuterol-Budesonide (Airsupra) 90-80 MCG/ACT AERO Inhale 2 puffs every 6 (six) hours as needed (Asthma, wheezing, coughing) RINSE MOUTH 10.7 g 3    cetirizine (ZyrTEC) 10 mg tablet Take 1 tablet (10 mg total) by mouth daily 30 tablet 11    fluticasone (FLONASE) 50 mcg/act nasal spray 2 sprays into each nostril daily 48 mL 4    Fluticasone-Salmeterol,sensor, (AirDuo Digihaler) 113-14 MCG/ACT AEPB Inhale 1 puff 2 (two) times a day Rinse mouth after use. 1 each 11    azelastine (ASTELIN) 0.1 % nasal spray 2 sprays into each nostril daily Use in each nostril as directed      meloxicam (Mobic) 15 mg tablet Take 1 tablet (15 mg total) by mouth daily 30 tablet 2     No current facility-administered medications on file prior to visit.      Social History     Tobacco Use    Smoking status: Some Days     Types: Cigars    Smokeless tobacco: Never   Vaping Use    Vaping status: Never Used   Substance and Sexual Activity    Alcohol use: Yes     Alcohol/week: 2.0 standard drinks of alcohol     Types: 2 Cans of beer per week    Drug use: Never    Sexual activity: Yes     Partners: Female     Birth control/protection: Condom Male       Objective   /82   Pulse 84   Ht 5' 11\" (1.803 m)   Wt 114 kg (251 lb)   SpO2 98%   BMI 35.01 kg/m²     Physical Exam  Constitutional:       General: He is not in acute distress.     Appearance: Normal appearance. He is obese. He is not ill-appearing, toxic-appearing or diaphoretic.   HENT:      Head: Normocephalic and atraumatic.      Right Ear: Tympanic " membrane, ear canal and external ear normal. There is no impacted cerumen.      Left Ear: Tympanic membrane, ear canal and external ear normal. There is no impacted cerumen.      Nose: Nose normal. No congestion or rhinorrhea.      Mouth/Throat:      Mouth: Mucous membranes are moist.      Pharynx: Oropharynx is clear. No oropharyngeal exudate or posterior oropharyngeal erythema.   Eyes:      General:         Right eye: No discharge.         Left eye: No discharge.      Extraocular Movements: Extraocular movements intact.      Pupils: Pupils are equal, round, and reactive to light.   Cardiovascular:      Rate and Rhythm: Normal rate and regular rhythm.      Heart sounds: Normal heart sounds. No murmur heard.     No friction rub. No gallop.   Pulmonary:      Effort: Pulmonary effort is normal. No respiratory distress.      Breath sounds: Normal breath sounds. No stridor. No wheezing, rhonchi or rales.   Abdominal:      General: Bowel sounds are normal. There is no distension.      Palpations: Abdomen is soft.      Tenderness: There is no abdominal tenderness.   Musculoskeletal:      Cervical back: Neck supple. No tenderness.   Lymphadenopathy:      Cervical: No cervical adenopathy.   Skin:     General: Skin is warm.      Capillary Refill: Capillary refill takes less than 2 seconds.   Neurological:      Mental Status: He is alert.      Sensory: No sensory deficit (light touch in all 4 extremities).      Motor: No weakness (5/5 in all 4 extremities).      Deep Tendon Reflexes: Reflexes normal (2/4, intact, C5, C6, L4, S1).